# Patient Record
(demographics unavailable — no encounter records)

---

## 2025-07-08 NOTE — LETTER BODY
[FreeTextEntry1] : Elijah Ross MD [address] [place] [address] [place] [address] [phone]  Dear Dr Ross,   Reason for Visit: Elevated PSA.     This is a 66 year-old male with elevated PSA. He was last seen in 2022. Patient reports that he has not had previous prostate biopsy. His current PSA is 5.81. Patient denies any hematuria or lower urinary tract symptoms. He denies any pain. The patient denies any aggravating or relieving factors. The patient denies any interference of function. The patient is entirely asymptomatic. All other review of systems are negative. He has no cancer in his family medical history. He has no previous surgical history. Past medical history, family history and social history were inquired and were noncontributory to current condition. The patient does not use tobacco or drink alcohol. Medications and allergies were reviewed. He has no known allergies to medication.   On examination, the patient is a well-appearing male in no acute distress. He is alert and oriented follows commands. He has normal mood and affect. He is normocephalic. Neck is supple. Oral no thrush. Respirations are unlabored. His abdomen is soft and nontender. Bladder is nonpalpable. No CVA tenderness. Neurologically he is grossly intact. No peripheral edema. Skin without gross abnormality.    His PSA was 5.81 in May 2025 which is elevated.   Assessment: Elevated PSA.    I counseled the patient. I discussed with him the risk of occult malignancy. I recommended he obtain a prostate MRI for fusion targeted prostate biopsy. I counseled the patient regarding the procedure. The risks and benefits were discussed. Alternatives were given. I answered the patient's questions. He will take the necessary preparations for the procedure, including fleet enema. He will repeat PSA, urinalysis and BMP today to establish baselines. The patient will follow-up as directed and will contact me with any questions or concerns. Thank you for the opportunity to participate in the care of Mr. VIERA. I will keep you updated on his progress.   Plan: Prostate MRI.  Fusion biopsy.  PSA. BMP. Urinalysis. Follow up as directed.

## 2025-07-08 NOTE — ADDENDUM
[FreeTextEntry1] : Entered by Eloina Penaloza, acting as scribe for Dr Srinath Arreaga. The documentation recorded by the scribe accurately reflects the service I personally performed and the decisions made by me.